# Patient Record
Sex: MALE
[De-identification: names, ages, dates, MRNs, and addresses within clinical notes are randomized per-mention and may not be internally consistent; named-entity substitution may affect disease eponyms.]

---

## 2019-10-09 ENCOUNTER — HOSPITAL ENCOUNTER (EMERGENCY)
Dept: HOSPITAL 92 - ERS | Age: 24
Discharge: HOME | End: 2019-10-09
Payer: COMMERCIAL

## 2019-10-09 DIAGNOSIS — S01.111A: Primary | ICD-10-CM
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
## 2019-10-09 PROCEDURE — 70486 CT MAXILLOFACIAL W/O DYE: CPT

## 2019-10-09 PROCEDURE — 70450 CT HEAD/BRAIN W/O DYE: CPT

## 2019-10-09 NOTE — CT
CT FACIAL BONES:

10/9/19

 

INDICATIONS:

Injury over right eye. 

 

FINDINGS: 

Nasal bones appear intact. Orbits appear intact. Lamina papyracea intact. 

 

The paranasal sinuses and mastoids are clear. Zygoma are intact. The maxilla appears intact. Mandible
 is intact. 

 

Mild soft tissue swelling over the right orbit. 

 

IMPRESSION: 

No evidence of facial bone fracture. 

 

POS: [facility]

## 2019-10-09 NOTE — CT
Exam: Head CT without contrast





HISTORY: Right periorbital trauma. Bruising.



COMPARISON: none





FINDINGS:

Hemorrhage: No intraparenchymal hemorrhage or extra-axial hematoma.



Brain parenchyma: Cortical gray-white matter differentiation is preserved. No mass effect or midline 
shift. Basilar cisterns are patent.

Ventricular system: Ventricles and sulci are patent and symmetric.

Calvarium: Intact.

Sinuses and mastoid air cells: Adequate aeration.



IMPRESSION:

No intracranial post traumatic sequelae.







Reported By: Carla Bell 

Electronically Signed:  10/9/2019 3:28 PM